# Patient Record
Sex: FEMALE | Race: WHITE | NOT HISPANIC OR LATINO | Employment: OTHER | URBAN - METROPOLITAN AREA
[De-identification: names, ages, dates, MRNs, and addresses within clinical notes are randomized per-mention and may not be internally consistent; named-entity substitution may affect disease eponyms.]

---

## 2024-01-03 ENCOUNTER — TELEPHONE (OUTPATIENT)
Dept: HEMATOLOGY/ONCOLOGY | Facility: CLINIC | Age: 74
End: 2024-01-03

## 2024-01-03 NOTE — TELEPHONE ENCOUNTER
----- Message from Lilibeth Parra sent at 1/3/2024 10:32 AM CST -----  Regarding: Consult/Advisory      Name Of Caller:    Sandy      Contact Preference:    [phone]       Nature of call:   Pt is interested in being referred as a NP to the [facility] in [place]. She has some questions.

## 2025-05-26 ENCOUNTER — TELEPHONE (OUTPATIENT)
Dept: HEMATOLOGY/ONCOLOGY | Facility: CLINIC | Age: 75
End: 2025-05-26
Payer: MEDICARE

## 2025-05-26 DIAGNOSIS — C25.9 PANCREATIC ADENOCARCINOMA: Primary | ICD-10-CM

## 2025-05-26 NOTE — NURSING
Spoke with patient and scheduled appointment for 05/29/2025 with Dr. Archer; Provided patient with appointment time and date, address of [facility] facility and direct line to navigator. All questions and concerns addressed.

## 2025-05-28 PROBLEM — C25.9 PANCREATIC CANCER: Status: ACTIVE | Noted: 2025-05-28

## 2025-05-28 NOTE — PROGRESS NOTES
MEDICAL ONCOLOGY - NEW PATIENT VISIT    Reason for visit: Metastatic pancreatic cancer    Best Contact Phone Number(s): [phone] (home)      Cancer/Stage/TNM:    Cancer Staging   No matching staging information was found for the patient.       Oncology History    No history exists.        HPI:   75 y.o. female with pAfib s/p JEREL ligation, CAD s/p 2v CABG 2021, mitral valve replacement, DMII with polyneuropathy, HLD, Chronic kidney disease 3a, CVA, anxiety, pancreatic insufficiency following surgical resection for pancreatic adenocarcinoma who presents for evaluation of metastatic pancreatic adenocarcinoma.    Per discharge summary from [facility] on 5/12/2025:  75 year old female with history of pAfib s/p JEREL ligation, CAD s/p 2v CABG 2021, mitral valve replacement, DMII with polyneuropathy, HLD, Chronic kidney disease 3a, CVA, anxiety, pancreatic insufficiency following surgical resection for pancreatic adenocarcinoma presenting to ED with   Abdominal pain and change in bowel habits. CT on arrival showed nonspecific colitis. She then developed left colonic obstruction with general surgery taking patient for ex lap on 4/30/25 performing ileocecectomy with end ileostomy creation. Peritoneal biopsies confirmed peritoneal carcinomatosis/stage IV recurrence of pancreatic adenocarcinoma. Post operative course was complicated by high output from ostomy, fevers, and poor nutritional intake. ID was consulted. She had positive c dif serologies with patient completing course of oral vancomycin prior to discharge. They assisted with IV antibiotic recommendations for her post operative fevers ultimately recommending switch to oral antibiotics at time of discharge. Output from her ostomy eventually responded to combination of lomotil and imodium. Although her appetite was poor she was tolerating diet by discharge. Oncology also followed with patient wishing to pursue second opinion from [facility] though with her poor  functional status and failure to thrive oncology did not feel patient would be a candidate for systemic chemotherapy options even after recovery from abdominal surgery. She was discharged home with home health services.      Colonic obstruction ultimately found to be related to recurrent pancreatic cancer. Patient underwent distal pancreatectomy, splenectomy, partial colectomy back in December 2023 afterwhich she was unable to tolerate different cancer directed therapies due to toxicity.     Per surgical oncology note 2/20/2025:  She had pancreatic cancer in the body and tail the pancreas and intraoperatively it was found to be much more extensive.  We took her tail and body of the pancreas with splenectomy as well as stripped the SMA and took a portion of the portal vein.  We also took some of her colon.  This had extensive cancer throughout.     Her final pathology was a greater than 6 cm cancer with cancer that tracked through the adipose tissue and multiple areas including multiple margins.  She had 1 of 26 lymph nodes positive as well.      Subjective:  Has mid back pain and some abdominal pain. States she has passed out 3 times since her hospitalization due to her blood pressure dropping when she stands. She continues to lose weight. Appetite is poor, but was apparently normal prior to hospitalization and is now starting to improve. She does throw up meals on occasion but tolerates them at other times.       Patient presents with  and daughter.  ECOG PS is 2.  History has been obtained by chart review and discussion with the patient.    ROS:   Review of Systems   Constitutional:  Positive for weight loss. Negative for malaise/fatigue.   HENT:  Negative for congestion and nosebleeds.    Respiratory:  Negative for hemoptysis and shortness of breath.    Cardiovascular:  Negative for chest pain and palpitations.   Gastrointestinal:  Positive for abdominal pain, nausea and vomiting. Negative for blood in  stool and melena.   Genitourinary:  Negative for dysuria and hematuria.   Musculoskeletal:  Positive for back pain. Negative for myalgias.   Neurological:  Positive for dizziness, loss of consciousness and weakness.   Psychiatric/Behavioral:  Negative for memory loss. The patient does not have insomnia.        Past Medical History:   No past medical history on file.     Past Surgical History:   No past surgical history on file.     Family History:   No family history on file.     Social History:   Social History     Tobacco Use    Smoking status: Not on file    Smokeless tobacco: Not on file   Substance Use Topics    Alcohol use: Not on file        I have reviewed and updated the patient's past medical, surgical, family and social histories.    Allergies:   Review of patient's allergies indicates:   Allergen Reactions    Wheat containing prod Diarrhea     Patient states she has Celiac Disease; Allergic to all grains     Patient states she has Celiac Disease; Allergic to all grains    Sulfa (sulfonamide antibiotics) Itching and Rash        Medications:   Current Medications[1]     Physical Exam:   /83 (BP Location: Right arm, Patient Position: Sitting)   Pulse 99   Temp 97 °F (36.1 °C) (Temporal)   Resp 18   Wt 47.7 kg (105 lb 2.6 oz)   SpO2 98%                Physical Exam  Constitutional:       Appearance: Normal appearance.      Comments: Frail   HENT:      Head: Normocephalic and atraumatic.   Eyes:      Extraocular Movements: Extraocular movements intact.      Pupils: Pupils are equal, round, and reactive to light.   Cardiovascular:      Rate and Rhythm: Normal rate and regular rhythm.   Pulmonary:      Effort: Pulmonary effort is normal.      Breath sounds: Normal breath sounds.   Abdominal:      General: Abdomen is flat.      Palpations: Abdomen is soft.   Musculoskeletal:         General: No swelling. Normal range of motion.      Cervical back: Normal range of motion and neck supple.   Skin:      General: Skin is warm and dry.   Neurological:      General: No focal deficit present.      Mental Status: She is alert and oriented to person, place, and time.   Psychiatric:         Mood and Affect: Mood normal.         Behavior: Behavior normal.           Labs:   Recent Results (from the past 48 hours)   CBC with Differential    Collection Time: 05/29/25  1:38 PM   Result Value Ref Range    WBC 11.00 3.90 - 12.70 K/uL    RBC 4.02 4.00 - 5.40 M/uL    HGB 11.8 (L) 12.0 - 16.0 gm/dL    HCT 36.5 (L) 37.0 - 48.5 %    MCV 91 82 - 98 fL    MCH 29.4 27.0 - 31.0 pg    MCHC 32.3 32.0 - 36.0 g/dL    RDW 14.5 11.5 - 14.5 %    Platelet Count 307 150 - 450 K/uL    MPV 12.1 9.2 - 12.9 fL    Nucleated RBC 0 <=0 /100 WBC    Neut % 57.8 38 - 73 %    Lymph % 29.0 18 - 48 %    Mono % 10.4 4 - 15 %    Eos % 1.4 <=8 %    Basophil % 1.1 <=1.9 %    Imm Grans % 0.3 0.0 - 0.5 %    Neut # 6.37 1.8 - 7.7 K/uL    Lymph # 3.19 1 - 4.8 K/uL    Mono # 1.14 (H) 0.3 - 1 K/uL    Eos # 0.15 <=0.5 K/uL    Baso # 0.12 <=0.2 K/uL    Imm Grans # 0.03 0.00 - 0.04 K/uL      CA 19-9  433.9      Imaging:    CT A/P with contrast 5/4/2025  1.  Interval partial colectomy with ostomy in the right lower quadrant.   2.  Nonspecific transverse colitis.   3.  Bibasilar atelectasis. Component of pneumonia not excluded on the   right.      CTA chest 4/25/2025  Bilateral small pleural effusions greater on the   right than left. Adjacent consolidation/atelectasis. Mild interstitial   opacification may represent some degree of edema. Previous sternotomy. Left   chest port. Atherosclerosis within the thoracic aorta and coronary vessels.   There are no filling defects/pulmonary emboli demonstrated. Surrounding   bony structures and soft tissues appear within normal limits.         Assessment:       1. Malignant neoplasm of pancreas, unspecified location of malignancy          Plan:             # Metastatic pancreatic cancer  Patient underwent distal pancreatectomy,  "splenectomy, partial colectomy back in December 2023. Underwent adjuvant chemotherapy. No hematology/oncology notes in system, uncertain what adjuvant treatment she received. However, she states she received a stronger regimen for "3 treatments" (states she had a pump for 2 days at a time, likely implying a 5-FU based regimen), but she could not tolerate, so was switched to gemcitabine. She showed us a treatment note which showed she may also have received capecitabine. She received "1 treatment" of this regimen before being hospitalized twice for dehydration and hypokalemia.     She then developed left colonic obstruction with general surgery taking patient for ex lap on 4/30/25 performing ileocecectomy with end ileostomy creation. Peritoneal biopsies confirmed peritoneal carcinomatosis/stage IV recurrence of pancreatic adenocarcinoma.    She has multiple cardiac comorbidities. She has orthostatic hypotension which has caused her to lose consciousness several times in the last few weeks. She also requires IV fluids three times per week for hydration. She is frail and presented with bowel obstruction, and has ongoing weight loss and poor appetite. We expressed concerns that chemotherapy could cause more harm than benefit due to these issues. Although patient is frail with poor performance status, she expressed that she would like to try chemotherapy even if there is a risk it could cause her to be hospitalized and is potentially life-threatening.       Recommendations:  - Can trial single agent gemcitabine as treatment  - Would recommend appetite stimulant such as Marinol or mirtazapine  - Recommend palliative care consult for assistance with symptom management  - Consider stopping metoprolol due to orthostatic hypotension  - Consider midodrine for blood pressure support if continued orthostatic hypotension   - Continue IV fluid hydration        Med Onc Chart Routing      Follow up with physician . Follow up as " needed   Follow up with [facility]    Infusion scheduling note    Injection scheduling note    Labs    Imaging    Pharmacy appointment    Other referrals                The above information has been reviewed with the patient and all questions have been answered to their apparent satisfaction.  They understand that they can call the clinic with any questions.      Thai Davis DO  Hematology/Oncology Fellow, PGY-IV  [facility]           [1]   Current Outpatient Medications   Medication Sig Dispense Refill    allopurinoL (ZYLOPRIM) 300 MG tablet Take 300 mg by mouth once daily.      alosetron (LOTRONEX) 0.5 MG tablet Take 0.5 mg by mouth 2 (two) times daily.      aspirin 81 MG Chew Take 81 mg by mouth once daily.      blood-glucose meter (ONETOUCH ULTRA2 METER MISC) CHECK DAILY      diphenoxylate-atropine 2.5-0.025 mg (LOMOTIL) 2.5-0.025 mg per tablet Take 1 tablet by mouth 4 (four) times daily as needed for Diarrhea.      famotidine (PEPCID) 40 MG tablet Take 40 mg by mouth once daily.      fluconazole (DIFLUCAN) 150 MG Tab Take 150 mg by mouth once daily.      gabapentin (NEURONTIN) 300 MG capsule Take 300 mg by mouth 3 (three) times daily.      metoprolol tartrate (LOPRESSOR) 25 MG tablet Take 25 mg by mouth 2 (two) times daily.      ondansetron (ZOFRAN-ODT) 8 MG TbDL Take 8 mg by mouth once.      oxyCODONE-acetaminophen (PERCOCET) 5-325 mg per tablet Take 1 tablet by mouth every 4 (four) hours as needed for Pain.      traZODone (DESYREL) 50 MG tablet Take 50 mg by mouth every evening.       No current facility-administered medications for this visit.

## 2025-05-29 ENCOUNTER — LAB VISIT (OUTPATIENT)
Dept: LAB | Facility: HOSPITAL | Age: 75
End: 2025-05-29
Attending: INTERNAL MEDICINE
Payer: MEDICARE

## 2025-05-29 ENCOUNTER — OFFICE VISIT (OUTPATIENT)
Dept: HEMATOLOGY/ONCOLOGY | Facility: CLINIC | Age: 75
End: 2025-05-29
Payer: MEDICARE

## 2025-05-29 VITALS
TEMPERATURE: 97 F | DIASTOLIC BLOOD PRESSURE: 83 MMHG | WEIGHT: 105.19 LBS | RESPIRATION RATE: 18 BRPM | OXYGEN SATURATION: 98 % | SYSTOLIC BLOOD PRESSURE: 136 MMHG | HEART RATE: 99 BPM

## 2025-05-29 DIAGNOSIS — C25.9 MALIGNANT NEOPLASM OF PANCREAS, UNSPECIFIED LOCATION OF MALIGNANCY: Primary | ICD-10-CM

## 2025-05-29 DIAGNOSIS — C25.9 PANCREATIC ADENOCARCINOMA: ICD-10-CM

## 2025-05-29 DIAGNOSIS — I95.1 ORTHOSTATIC HYPOTENSION: ICD-10-CM

## 2025-05-29 DIAGNOSIS — E43 SEVERE MALNUTRITION: ICD-10-CM

## 2025-05-29 LAB
ABSOLUTE EOSINOPHIL (OHS): 0.15 K/UL
ABSOLUTE MONOCYTE (OHS): 1.14 K/UL (ref 0.3–1)
ABSOLUTE NEUTROPHIL COUNT (OHS): 6.37 K/UL (ref 1.8–7.7)
ALBUMIN SERPL BCP-MCNC: 3.7 G/DL (ref 3.5–5.2)
ALP SERPL-CCNC: 211 UNIT/L (ref 40–150)
ALT SERPL W/O P-5'-P-CCNC: 12 UNIT/L (ref 10–44)
ANION GAP (OHS): 11 MMOL/L (ref 8–16)
AST SERPL-CCNC: 21 UNIT/L (ref 11–45)
BASOPHILS # BLD AUTO: 0.12 K/UL
BASOPHILS NFR BLD AUTO: 1.1 %
BILIRUB SERPL-MCNC: 0.2 MG/DL (ref 0.1–1)
BUN SERPL-MCNC: 26 MG/DL (ref 8–23)
CALCIUM SERPL-MCNC: 9.6 MG/DL (ref 8.7–10.5)
CANCER AG19-9 SERPL-ACNC: 433.9 U/ML
CHLORIDE SERPL-SCNC: 111 MMOL/L (ref 95–110)
CO2 SERPL-SCNC: 14 MMOL/L (ref 23–29)
CREAT SERPL-MCNC: 1.3 MG/DL (ref 0.5–1.4)
ERYTHROCYTE [DISTWIDTH] IN BLOOD BY AUTOMATED COUNT: 14.5 % (ref 11.5–14.5)
GFR SERPLBLD CREATININE-BSD FMLA CKD-EPI: 43 ML/MIN/1.73/M2
GLUCOSE SERPL-MCNC: 141 MG/DL (ref 70–110)
HCT VFR BLD AUTO: 36.5 % (ref 37–48.5)
HGB BLD-MCNC: 11.8 GM/DL (ref 12–16)
IMM GRANULOCYTES # BLD AUTO: 0.03 K/UL (ref 0–0.04)
IMM GRANULOCYTES NFR BLD AUTO: 0.3 % (ref 0–0.5)
LYMPHOCYTES # BLD AUTO: 3.19 K/UL (ref 1–4.8)
MCH RBC QN AUTO: 29.4 PG (ref 27–31)
MCHC RBC AUTO-ENTMCNC: 32.3 G/DL (ref 32–36)
MCV RBC AUTO: 91 FL (ref 82–98)
NUCLEATED RBC (/100WBC) (OHS): 0 /100 WBC
PLATELET # BLD AUTO: 307 K/UL (ref 150–450)
PMV BLD AUTO: 12.1 FL (ref 9.2–12.9)
POTASSIUM SERPL-SCNC: 4.7 MMOL/L (ref 3.5–5.1)
PROT SERPL-MCNC: 7.6 GM/DL (ref 6–8.4)
RBC # BLD AUTO: 4.02 M/UL (ref 4–5.4)
RELATIVE EOSINOPHIL (OHS): 1.4 %
RELATIVE LYMPHOCYTE (OHS): 29 % (ref 18–48)
RELATIVE MONOCYTE (OHS): 10.4 % (ref 4–15)
RELATIVE NEUTROPHIL (OHS): 57.8 % (ref 38–73)
SODIUM SERPL-SCNC: 136 MMOL/L (ref 136–145)
WBC # BLD AUTO: 11 K/UL (ref 3.9–12.7)

## 2025-05-29 PROCEDURE — 3079F DIAST BP 80-89 MM HG: CPT | Mod: CPTII,S$GLB,, | Performed by: INTERNAL MEDICINE

## 2025-05-29 PROCEDURE — 99999 PR PBB SHADOW E&M-EST. PATIENT-LVL III: CPT | Mod: PBBFAC,,, | Performed by: INTERNAL MEDICINE

## 2025-05-29 PROCEDURE — 1159F MED LIST DOCD IN RCRD: CPT | Mod: CPTII,S$GLB,, | Performed by: INTERNAL MEDICINE

## 2025-05-29 PROCEDURE — 3075F SYST BP GE 130 - 139MM HG: CPT | Mod: CPTII,S$GLB,, | Performed by: INTERNAL MEDICINE

## 2025-05-29 PROCEDURE — 3044F HG A1C LEVEL LT 7.0%: CPT | Mod: CPTII,S$GLB,, | Performed by: INTERNAL MEDICINE

## 2025-05-29 PROCEDURE — 1101F PT FALLS ASSESS-DOCD LE1/YR: CPT | Mod: CPTII,S$GLB,, | Performed by: INTERNAL MEDICINE

## 2025-05-29 PROCEDURE — 36415 COLL VENOUS BLD VENIPUNCTURE: CPT

## 2025-05-29 PROCEDURE — 86301 IMMUNOASSAY TUMOR CA 19-9: CPT

## 2025-05-29 PROCEDURE — 85025 COMPLETE CBC W/AUTO DIFF WBC: CPT

## 2025-05-29 PROCEDURE — 84075 ASSAY ALKALINE PHOSPHATASE: CPT

## 2025-05-29 PROCEDURE — 3288F FALL RISK ASSESSMENT DOCD: CPT | Mod: CPTII,S$GLB,, | Performed by: INTERNAL MEDICINE

## 2025-05-29 PROCEDURE — 1126F AMNT PAIN NOTED NONE PRSNT: CPT | Mod: CPTII,S$GLB,, | Performed by: INTERNAL MEDICINE

## 2025-05-29 PROCEDURE — 99204 OFFICE O/P NEW MOD 45 MIN: CPT | Mod: S$GLB,,, | Performed by: INTERNAL MEDICINE

## 2025-05-29 RX ORDER — METOPROLOL TARTRATE 25 MG/1
25 TABLET, FILM COATED ORAL 2 TIMES DAILY
COMMUNITY

## 2025-05-29 RX ORDER — GABAPENTIN 300 MG/1
300 CAPSULE ORAL 3 TIMES DAILY
COMMUNITY

## 2025-05-29 RX ORDER — ONDANSETRON 8 MG/1
8 TABLET, ORALLY DISINTEGRATING ORAL ONCE
COMMUNITY

## 2025-05-29 RX ORDER — NAPROXEN SODIUM 220 MG/1
81 TABLET, FILM COATED ORAL DAILY
COMMUNITY

## 2025-05-29 RX ORDER — OXYCODONE AND ACETAMINOPHEN 5; 325 MG/1; MG/1
1 TABLET ORAL EVERY 4 HOURS PRN
COMMUNITY

## 2025-05-29 RX ORDER — FAMOTIDINE 40 MG/1
40 TABLET, FILM COATED ORAL DAILY
COMMUNITY

## 2025-05-29 RX ORDER — DIPHENOXYLATE HYDROCHLORIDE AND ATROPINE SULFATE 2.5; .025 MG/1; MG/1
1 TABLET ORAL 4 TIMES DAILY PRN
COMMUNITY

## 2025-05-29 RX ORDER — TRAZODONE HYDROCHLORIDE 50 MG/1
50 TABLET ORAL NIGHTLY
COMMUNITY

## 2025-05-29 RX ORDER — ALLOPURINOL 300 MG/1
300 TABLET ORAL DAILY
COMMUNITY

## 2025-05-29 RX ORDER — FLUCONAZOLE 150 MG/1
150 TABLET ORAL DAILY
COMMUNITY

## 2025-05-29 RX ORDER — ALOSETRON HYDROCHLORIDE 0.5 MG/1
0.5 TABLET ORAL 2 TIMES DAILY
COMMUNITY
Start: 2024-06-07

## 2025-06-02 ENCOUNTER — TELEPHONE (OUTPATIENT)
Dept: HEMATOLOGY/ONCOLOGY | Facility: CLINIC | Age: 75
End: 2025-06-02
Payer: MEDICARE

## 2025-06-06 LAB
ONEOME COMMENT: NORMAL
ONEOME METHOD: NORMAL